# Patient Record
Sex: FEMALE | Race: WHITE
[De-identification: names, ages, dates, MRNs, and addresses within clinical notes are randomized per-mention and may not be internally consistent; named-entity substitution may affect disease eponyms.]

---

## 2017-07-31 ENCOUNTER — HOSPITAL ENCOUNTER (EMERGENCY)
Dept: HOSPITAL 74 - JER | Age: 62
LOS: 1 days | Discharge: HOME | End: 2017-08-01
Payer: COMMERCIAL

## 2017-07-31 VITALS — HEART RATE: 79 BPM | SYSTOLIC BLOOD PRESSURE: 114 MMHG | TEMPERATURE: 97.9 F | DIASTOLIC BLOOD PRESSURE: 65 MMHG

## 2017-07-31 VITALS — BODY MASS INDEX: 36.8 KG/M2

## 2017-07-31 DIAGNOSIS — Z86.79: ICD-10-CM

## 2017-07-31 DIAGNOSIS — N30.00: Primary | ICD-10-CM

## 2017-07-31 DIAGNOSIS — E03.9: ICD-10-CM

## 2017-07-31 LAB
ALBUMIN SERPL-MCNC: 3.9 G/DL (ref 3.4–5)
ALP SERPL-CCNC: 89 U/L (ref 45–117)
ALT SERPL-CCNC: 26 U/L (ref 12–78)
ANION GAP SERPL CALC-SCNC: 5 MMOL/L (ref 8–16)
APPEARANCE UR: (no result)
AST SERPL-CCNC: 17 U/L (ref 15–37)
BASOPHILS # BLD: 1 % (ref 0–2)
BILIRUB SERPL-MCNC: 0.3 MG/DL (ref 0.2–1)
BILIRUB UR STRIP.AUTO-MCNC: NEGATIVE MG/DL
CALCIUM SERPL-MCNC: 9.4 MG/DL (ref 8.5–10.1)
CO2 SERPL-SCNC: 28 MMOL/L (ref 21–32)
COLOR UR: YELLOW
CREAT SERPL-MCNC: 0.7 MG/DL (ref 0.55–1.02)
DEPRECATED RDW RBC AUTO: 14.5 % (ref 11.6–15.6)
EOSINOPHIL # BLD: 3 % (ref 0–4.5)
GLUCOSE SERPL-MCNC: 76 MG/DL (ref 74–106)
KETONES UR QL STRIP: NEGATIVE
LEUKOCYTE ESTERASE UR QL STRIP.AUTO: (no result)
MCH RBC QN AUTO: 29.2 PG (ref 25.7–33.7)
MCHC RBC AUTO-ENTMCNC: 33.5 G/DL (ref 32–36)
MCV RBC: 87.1 FL (ref 80–96)
MUCOUS THREADS URNS QL MICRO: (no result)
NEUTROPHILS # BLD: 60.7 % (ref 42.8–82.8)
NITRITE UR QL STRIP: NEGATIVE
PH UR: 5 [PH] (ref 5–8)
PLATELET # BLD AUTO: 231 K/MM3 (ref 134–434)
PMV BLD: 8 FL (ref 7.5–11.1)
PROT SERPL-MCNC: 7.6 G/DL (ref 6.4–8.2)
PROT UR QL STRIP: NEGATIVE
PROT UR QL STRIP: NEGATIVE
RBC # BLD AUTO: 50 /HPF (ref 0–3)
RBC # UR STRIP: (no result) /UL
SP GR UR: >= 1.03 (ref 1–1.02)
UROBILINOGEN UR STRIP-MCNC: NEGATIVE MG/DL (ref 0.2–1)
WBC # BLD AUTO: 9.1 K/MM3 (ref 4–10)
WBC # UR AUTO: 189 /HPF (ref 3–5)

## 2017-07-31 NOTE — PDOC
Attending Attestation





- Resident


Resident Name: Berto Montenegro





- ED Attending Attestation


I have performed the following: I have examined & evaluated the patient, The 

case was reviewed & discussed with the resident, I agree w/resident's findings 

& plan, Exceptions are as noted





- HPI


HPI: 





07/31/17 20:01


Agree with the resident's HPI as documented in the electronic medical record.





- Physicial Exam


PE: 





07/31/17 20:01


Agree with the resident's physical examination as documented in the electronic 

medical record.





- Medical Decision Making





07/31/17 20:01


And 62-year-old female with history of hypertension and hypothyroid disease 

presents to the emergency Department with complaints of lower abdominal pain 

since last night along with dysuria. Differential diagnosis includes but is not 

limited to: UTI, pyelonephritis, ovarian pathology, nephrolithiasis.


Plan:


1. Labs


2. Urine analysis


3. Pain management


4. CT scan of the abdomen and pelvis


5. Observe and reevaluate

## 2017-07-31 NOTE — PDOC
History of Present Illness





- General


Chief Complaint: Pain, Acute


Stated Complaint: PAIN, ACUTE


Time Seen by Provider: 07/31/17 18:53





- History of Present Illness


Initial Comments: 


07/31/17 19:58


Patient is a 62 year old female with a history of hypothyroidism, SVT who 

presents with lower abdominal pain.  Patient reports onset of intermittent 8/10 

sharp lower abdominal pain 24 hours ago.  She states that the pain lasts only a 

few seconds before resolving, however has been getting more frequent prompting 

her visit to the ED today.  She states that she was evaluated by her 

gynecologist today for vaginal itching and concerns of a yeast infection and 

informed her gynecologist of her pain.  She states that her gynecologist did a 

pelvic exam that was normal and suggested she go for an US tomorrow, however 

due to the pain getting more frequent, the patient opted to present to the ED 

today.  She does endorse some poorly described pain on urination, but denies 

any fevers, chills, chest pain, SOB, or changes with bowel movements. Of note, 

the patient is s/p cholecystectomy and a previous umbilical hernia repair.








Past History





- Past Medical History


Allergies/Adverse Reactions: 


 Allergies











Allergy/AdvReac Type Severity Reaction Status Date / Time


 


No Known Allergies Allergy   Verified 07/31/17 17:59











Home Medications: 


Ambulatory Orders





Levothyroxine Sodium [Synthroid] 125 mcg PO DAILY  tablet 06/29/15 


Aspirin [ASA -] 81 mg PO DAILY #30 tab.chew 12/27/16 


Levothyroxine [Synthroid -] 125 mcg PO DAILY@0700 #30 tablet 12/27/16 


Metoprolol Succinate [Toprol XL -] 25 mg PO DAILY #30 tab.sr.24h 12/27/16 


Levofloxacin [Levaquin -] 250 mg PO DAILY #2 tablet 08/01/17 








Cardiac Disorders: Yes (HX SVT)


Thyroid Disease: Yes (HYPOTHYROID)





- Surgical History


Abdominal Surgery: Yes (HERNIA)


Cholecystectomy: Yes





- Immunization History


Immunization Up to Date: Yes





- Psycho/Social/Smoking Cessation Hx


Suicidal Ideation: No


Smoking History: Current every day smoker


Have you smoked in the past 12 months: No


Number of Cigarettes Smoked Daily: 2


Information on smoking cessation initiated: No


Hx Alcohol Use: No


Drug/Substance Use Hx: No


Substance Use Type: None





**Review of Systems





- Review of Systems


Constitutional: No: Chills, Fever


Respiratory: No: Cough, Shortness of Breath


Cardiac (ROS): No: Chest Pain, Lightheadedness, Palpitations


ABD/GI: No: Constipated, Diarrhea, Nausea, Rectal Bleeding, Vomiting, Tarry 

Stools


: Yes: Dysuria.  No: Hematuria


Integumentary: No: Rash


Neurological: No: Headache, Numbness, Tingling, Weakness





*Physical Exam





- Vital Signs


 Last Vital Signs











Temp Pulse Resp BP Pulse Ox


 


 97.9 F   79   16   114/65   100 


 


 07/31/17 17:59  07/31/17 17:59  07/31/17 17:59  07/31/17 17:59  07/31/17 17:59














- Physical Exam


Comments: 





07/31/17 20:25


General Appearance:  Nourished.  No Apparent Distress


HEENT: No Pharyngeal Erythema, Tonsillar Exudate, Tonsillar Erythema


Respiratory/Chest:  Lungs Clear, Normal Breath Sounds.  No Crackles, Rales, 

Rhonchi, Wheezing


Cardiovascular:  Regular Rhythm, Regular Rate.  No Murmur, Gallop/S3, Gallop/S4


Gastrointestinal/Abdominal:  Normal Bowel Sounds, Soft, Diffuse tenderness to 

palpation in the lower quadrants worse in the lower right and suprapubic 

quadrants.  No Guarding, Rebound


Musculoskeletal:  Normal Inspection.  No CVA Tenderness


Extremity:  Normal Capillary Refill


Integumentary:  Normal Color, Dry, Warm


Neurologic:  Fully Oriented, Alert, Normal Mood/Affect, Normal Response








ED Treatment Course





- LABORATORY


CBC & Chemistry Diagram: 


 07/31/17 20:39





 07/31/17 20:39





Medical Decision Making





- Medical Decision Making


07/31/17 20:26


Patient is a 62 year old female who presents with lower abdominal pain.  

Differential includes but is not limited to: UTI, Nephrolethiasis, Appendicitis

, Ovarian pathology.  Given her previous evaluation by her gynecologist, we are 

less concerned for ovarian pathology at this time.  Given her complaints and 

physical exam, appendicitis and UTI are possibilities that must be evaluated.  

We will obtain a UA to evaluate for UTI, and will obtain a CT abdomen to 

evaluate for abdominal pathology such as appendicitis.  We will also obtain a 

cbc, cmp to evaluate for any infectious etiologies or electrolyte abnormalities.





07/31/17 21:47


CBC and CMP are unremarkable.  UA demonstrates 3+ leuk esterase as well as 100+ 

WBC concerning for uncomplicated UTI.  CT abdomen is still pending.





08/01/17 00:26


CT abdomen is negative as read by our radiologist.  The patient's symptoms are 

likely due to uncomplicated UTI.  We will treat with Levaquin 250mg for 3 days 

as this is her first incident of a UTI.  We discussed the results with the 

patient and feel comfortable discharging the patient home.  The patient is 

agreeable to the plan.








*DC/Admit/Observation/Transfer


Diagnosis at time of Disposition: 


UTI (urinary tract infection)


Qualifiers:


 Urinary tract infection type: acute cystitis Hematuria presence: without 

hematuria Qualified Code(s): N30.00 - Acute cystitis without hematuria





- Discharge Dispostion


Disposition: HOME


Condition at time of disposition: Improved


Admit: No





- Prescriptions


Prescriptions: 


Levofloxacin [Levaquin -] 250 mg PO DAILY #2 tablet





- Patient Instructions


Printed Discharge Instructions:  DI for Urinary Tract Infection (UTI)


Additional Instructions: 


Please return to the ER if you experience concerning or worsening symptoms 

including fevers or chills.





We have prescribed Levaquin for your UTI and given your first dose here in the 

ER.  Please take daily for the next 2 days.  Please follow up with your primary 

care provider to discuss your ER visit.





- Attestations


Physician Attestion: 





08/01/17 00:21








I, Dr. Berto Montenegro, attest that this document has been prepared under my 

direction and personally reviewed by me in its entirety.   I further attest, 

that it accurately reflects all work, treatment, procedures and medical decision

-making performed by me.

## 2023-03-20 ENCOUNTER — HOSPITAL ENCOUNTER (OUTPATIENT)
Dept: HOSPITAL 74 - FASU | Age: 68
Discharge: HOME | End: 2023-03-20
Attending: PLASTIC SURGERY
Payer: COMMERCIAL

## 2023-03-20 VITALS — DIASTOLIC BLOOD PRESSURE: 74 MMHG | SYSTOLIC BLOOD PRESSURE: 106 MMHG | HEART RATE: 70 BPM

## 2023-03-20 VITALS — RESPIRATION RATE: 16 BRPM | TEMPERATURE: 97.7 F

## 2023-03-20 VITALS — BODY MASS INDEX: 36 KG/M2

## 2023-03-20 DIAGNOSIS — T85.41XA: Primary | ICD-10-CM

## 2023-03-20 DIAGNOSIS — T85.44XA: ICD-10-CM
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
## 2023-03-20 PROCEDURE — 0HRT0JZ REPLACEMENT OF RIGHT BREAST WITH SYNTHETIC SUBSTITUTE, OPEN APPROACH: ICD-10-PCS | Performed by: PLASTIC SURGERY

## 2023-03-20 PROCEDURE — 19342 INSJ/RPLCMT BRST IMPLT SEP D: CPT

## 2023-03-20 PROCEDURE — C1789 PROSTHESIS, BREAST, IMP: HCPCS

## 2023-03-20 PROCEDURE — 19371 PERI-IMPLT CAPSLC BRST COMPL: CPT

## 2023-03-20 PROCEDURE — 0HPT0JZ REMOVAL OF SYNTHETIC SUBSTITUTE FROM RIGHT BREAST, OPEN APPROACH: ICD-10-PCS | Performed by: PLASTIC SURGERY

## 2024-02-24 PROBLEM — Z00.00 ENCOUNTER FOR PREVENTIVE HEALTH EXAMINATION: Status: ACTIVE | Noted: 2024-02-24

## 2024-03-01 NOTE — PHYSICAL EXAM
[General Appearance - Alert] : alert [General Appearance - In No Acute Distress] : in no acute distress [Fluency] : fluency intact [Comprehension] : comprehension intact [Cranial Nerves Optic (II)] : visual acuity intact bilaterally,  pupils equal round and reactive to light [Cranial Nerves Oculomotor (III)] : extraocular motion intact [Cranial Nerves Trigeminal (V)] : facial sensation intact symmetrically [Cranial Nerves Facial (VII)] : face symmetrical [Cranial Nerves Vestibulocochlear (VIII)] : hearing was intact bilaterally [Cranial Nerves Glossopharyngeal (IX)] : tongue and palate midline [Cranial Nerves Accessory (XI - Cranial And Spinal)] : head turning and shoulder shrug symmetric [Cranial Nerves Hypoglossal (XII)] : there was no tongue deviation with protrusion [Motor Strength] : muscle strength was normal in all four extremities [Sensation Tactile Decrease] : light touch was intact

## 2024-03-04 ENCOUNTER — NON-APPOINTMENT (OUTPATIENT)
Age: 69
End: 2024-03-04

## 2024-03-04 ENCOUNTER — APPOINTMENT (OUTPATIENT)
Dept: NEUROSURGERY | Facility: CLINIC | Age: 69
End: 2024-03-04
Payer: MEDICARE

## 2024-03-04 VITALS
HEART RATE: 61 BPM | DIASTOLIC BLOOD PRESSURE: 86 MMHG | HEIGHT: 64 IN | OXYGEN SATURATION: 97 % | SYSTOLIC BLOOD PRESSURE: 143 MMHG | BODY MASS INDEX: 35.85 KG/M2 | WEIGHT: 210 LBS

## 2024-03-04 PROCEDURE — 99205 OFFICE O/P NEW HI 60 MIN: CPT

## 2024-03-04 NOTE — END OF VISIT
[FreeTextEntry3] : I have seen the patient and reviewed the case together with PA and I agree with the final recommendations and plan of care.  Rancho Vaz MD Neurosurgery  [Time Spent: ___ minutes] : I have spent [unfilled] minutes of time on the encounter. [>50% of the face to face encounter time was spent on counseling and/or coordination of care for ___] : Greater than 50% of the face to face encounter time was spent on counseling and/or coordination of care for [unfilled]

## 2024-03-04 NOTE — HISTORY OF PRESENT ILLNESS
[de-identified] : PADDY HE is a 69 year female with a PMH of BPPV, depression, SVT, Hypothyroid, nephrolithiasis who presents to the office today at the request of Dr. Cas Delgado for neurosurgical consultation due to intracranial meningioma noted on imaging work up of chronic episodic vertigo and sensorineural hearing loss on right >left.   The patient denies headaches, visual change, numbness, weakness of extremities, speech disturbance, neck pain, tinnitus.   The patient underwent an MRI brain and IAC +/- on 2/12/24 at [facility] which was independently reviewed by me today, and demonstrates 1cm x 1 cm anterior frontal homogeneously enhancing extraaxial lesion most consistent with a meningioma.    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

## 2024-03-04 NOTE — ASSESSMENT
[FreeTextEntry1] : I have discussed the natural history and treatment options for intracranial meningiomas with the patient. I explained the different types of meningiomas and the indications for observation and imaging surveillance, medical management with antiepileptic medications and steroids, chemotherapy, radiation therapy, radiosurgery and surgery. I explained the indications of a combination of these treatment options.  In the end, I recommend follow up MRI stereotactic +/- under sedation for a more detailed baseline study due to poor quality of stand up MRI and patient should return to the office in 2-4 weeks once complete.      The patient understands the plan of care and is in agreement.  All questions answered to patient satisfaction.

## 2024-03-24 ENCOUNTER — RESULT REVIEW (OUTPATIENT)
Age: 69
End: 2024-03-24

## 2024-03-26 ENCOUNTER — NON-APPOINTMENT (OUTPATIENT)
Age: 69
End: 2024-03-26

## 2024-04-08 ENCOUNTER — APPOINTMENT (OUTPATIENT)
Dept: NEUROSURGERY | Facility: CLINIC | Age: 69
End: 2024-04-08
Payer: MEDICARE

## 2024-04-08 VITALS
SYSTOLIC BLOOD PRESSURE: 131 MMHG | DIASTOLIC BLOOD PRESSURE: 81 MMHG | HEIGHT: 64 IN | OXYGEN SATURATION: 96 % | WEIGHT: 210 LBS | BODY MASS INDEX: 35.85 KG/M2 | HEART RATE: 88 BPM

## 2024-04-08 DIAGNOSIS — D32.0 BENIGN NEOPLASM OF CEREBRAL MENINGES: ICD-10-CM

## 2024-04-08 PROCEDURE — 99215 OFFICE O/P EST HI 40 MIN: CPT

## 2024-04-08 NOTE — ASSESSMENT
[FreeTextEntry1] : I have discussed the natural history and treatment options for intracranial meningiomas with the patient. I explained the different types of meningiomas and the indications for observation and imaging surveillance, medical management with antiepileptic medications and steroids, chemotherapy, radiation therapy, radiosurgery and surgery. I explained the indications of a combination of these treatment options.  In the end, I recommend follow up MRI stereotactic +/- under sedation in 6 months to evaluate for tumor growth.         The patient understands the plan of care and is in agreement.  All questions answered to patient satisfaction.

## 2024-04-08 NOTE — DATA REVIEWED
[de-identified] : PROCEDURE:            MRI BRAIN WAW IC  ORDER #:              [id number] CC:                                         ;                     ;                     ; End of cc's  INDICATIONS:  Reevaluate intracranial mass on outside imaging.  TECHNIQUE: Multiplanar imaging was performed using T1 weighted, T2 weighted and FLAIR sequences. Diffusion weighted and susceptibility sensitive images were also obtained.  Following intravenous gadolinium, multiplanar T1 weighted images were performed. 9 cc Gadavist were administered. 1 cc was discarded.  COMPARISON EXAMINATION:  None available.  FINDINGS:  VENTRICLES AND SULCI:  Normal. INTRA-AXIAL:  No mass, abnormal signal or evidence of acute cerebral ischemia. No abnormal enhancement. A few scattered areas of subcortical white matter T2 hyperintensity consistent with mild chronic microvascular changes. EXTRA-AXIAL:  There is a 1.0 x 1.1 x 1.2 cm right frontal parafalcine mass with minimal extension into the left frontal parafalcine region. The mass is homogeneously enhancing and shows artifact on SWI sequence. There is no associated midline shift or surrounding vasogenic edema. There is no extra-axial collection. ORBITS: Unremarkable. VISUALIZED SINUSES:  Clear. VISUALIZED MASTOIDS:  Clear. CALVARIUM:  Normal. CAROTID FLOW VOIDS:  Present. MISCELLANEOUS:  None.   IMPRESSION:  Anterior parafalcine homogenously enhancing mass measuring up to 1.2 cm without surrounding vasogenic edema. This is most consistent with a meningioma.  --- End of Report ---           Electronically Signed:          ____________________________________________          Neno Noland MD          03/25/24 [id number]

## 2024-04-08 NOTE — END OF VISIT
[FreeTextEntry3] : I have seen the patient and reviewed the case together with PA and I agree with the final recommendations and plan of care.  Rancho Vaz MD Neurosurgery  [Time Spent: ___ minutes] : I have spent [unfilled] minutes of time on the encounter.

## 2024-04-08 NOTE — HISTORY OF PRESENT ILLNESS
[FreeTextEntry1] : Ms. He returns to the office today for interval follow up and imaging review for her anterior frontal meningioma initially noted on MRI brain and IAC on 2/12/24.  She has undergone MRI brain +/- under sedation at [facility] on 3/25/24, which I have independently reviewed today, and which revealed 1.2cm anterior parafalcine homogenously enhancing mass without surrounding vasogenic edema.  She has had no new neurological symptoms.    3/4/24: PADDY HE is a 69 year female with a PMH of BPPV, depression, SVT, Hypothyroid, nephrolithiasis who presents to the office today at the request of Dr. Cas Delgado for neurosurgical consultation due to intracranial meningioma noted on imaging work up of chronic episodic vertigo and sensorineural hearing loss on right >left.   The patient denies headaches, visual change, numbness, weakness of extremities, speech disturbance, neck pain, tinnitus.   The patient underwent an MRI brain and IAC +/- on 2/12/24 at [facility] which was independently reviewed by me today, and demonstrates 1cm x 1 cm anterior frontal homogeneously enhancing extraaxial lesion most consistent with a meningioma.    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

## 2024-10-22 ENCOUNTER — OFFICE (OUTPATIENT)
Facility: LOCATION | Age: 69
Setting detail: OPHTHALMOLOGY
End: 2024-10-22
Payer: MEDICARE

## 2024-10-22 DIAGNOSIS — H40.003: ICD-10-CM

## 2024-10-22 DIAGNOSIS — H35.013: ICD-10-CM

## 2024-10-22 PROCEDURE — 92004 COMPRE OPH EXAM NEW PT 1/>: CPT | Performed by: OPHTHALMOLOGY

## 2024-10-22 PROCEDURE — 92020 GONIOSCOPY: CPT | Performed by: OPHTHALMOLOGY

## 2024-10-22 ASSESSMENT — REFRACTION_MANIFEST
OS_VA1: 20/20-1
OD_VA1: 20/20-2
OS_SPHERE: +1.75
OS_AXIS: 171
OD_CYLINDER: +1.00
OD_SPHERE: +1.50
OS_CYLINDER: +1.25
OD_AXIS: 176

## 2024-10-22 ASSESSMENT — REFRACTION_CURRENTRX
OD_OVR_VA: 20/
OD_AXIS: 176
OD_SPHERE: +4.50
OS_CYLINDER: +0.25
OD_CYLINDER: +0.50
OS_OVR_VA: 20/
OS_AXIS: 177
OS_SPHERE: +5.00

## 2024-10-22 ASSESSMENT — TONOMETRY
OS_IOP_MMHG: 17
OD_IOP_MMHG: 17

## 2024-10-22 ASSESSMENT — VISUAL ACUITY
OD_BCVA: 20/40-2
OS_BCVA: 20/30-1

## 2024-10-22 ASSESSMENT — REFRACTION_AUTOREFRACTION
OS_AXIS: 171
OD_AXIS: 176
OS_SPHERE: +1.75
OS_CYLINDER: +1.25
OD_CYLINDER: +1.00
OD_SPHERE: +1.50

## 2024-10-22 ASSESSMENT — CONFRONTATIONAL VISUAL FIELD TEST (CVF)
OS_FINDINGS: FULL
OD_FINDINGS: FULL

## 2025-03-20 ENCOUNTER — RESULT REVIEW (OUTPATIENT)
Age: 70
End: 2025-03-20

## 2025-03-24 ENCOUNTER — TRANSCRIPTION ENCOUNTER (OUTPATIENT)
Age: 70
End: 2025-03-24

## 2025-04-08 ENCOUNTER — NON-APPOINTMENT (OUTPATIENT)
Age: 70
End: 2025-04-08

## 2025-04-09 ENCOUNTER — APPOINTMENT (OUTPATIENT)
Dept: NEUROSURGERY | Facility: CLINIC | Age: 70
End: 2025-04-09
Payer: MEDICARE

## 2025-04-09 VITALS
HEIGHT: 64 IN | BODY MASS INDEX: 34.66 KG/M2 | DIASTOLIC BLOOD PRESSURE: 62 MMHG | OXYGEN SATURATION: 95 % | SYSTOLIC BLOOD PRESSURE: 110 MMHG | HEART RATE: 80 BPM | WEIGHT: 203 LBS

## 2025-04-09 DIAGNOSIS — D32.0 BENIGN NEOPLASM OF CEREBRAL MENINGES: ICD-10-CM

## 2025-04-09 PROCEDURE — G2211 COMPLEX E/M VISIT ADD ON: CPT

## 2025-04-09 PROCEDURE — 99215 OFFICE O/P EST HI 40 MIN: CPT
